# Patient Record
Sex: FEMALE | Race: BLACK OR AFRICAN AMERICAN | RURAL
[De-identification: names, ages, dates, MRNs, and addresses within clinical notes are randomized per-mention and may not be internally consistent; named-entity substitution may affect disease eponyms.]

---

## 2020-11-03 ENCOUNTER — OFFICE VISIT CONVERTED (OUTPATIENT)
Dept: CARDIOLOGY | Facility: CLINIC | Age: 40
End: 2020-11-03
Attending: INTERNAL MEDICINE

## 2021-05-10 NOTE — H&P
History and Physical      Patient Name: Melba Lang   Patient ID: [id number]   Sex: Female   YOB: 1980        Visit Date: November 3, 2020    Provider: Sean Stahl MD   [facility]: [facility]   Location Address: [address] [place] [address]  [id number]          History Of Present Illness  Consult requested by: Teresa KIM   Dear Teresa, I saw Melba Lang in the office today. This is a 40 year old, /Black female. She has no previous cardiac history. Over the past several months, she has had intermittent chest discomfort, moderately intense, random, occurring a couple times a week, lasting 5 to 10 minutes in the left upper chest. It is not induced by physical exertion. 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 She has no previous cardiac risk factors. No excessive family history. She had a baseline EKG recently, which showed sinus rhythm with no ST changes. She had an echocardiogram in September, which showed normal biventricular function, ejection fraction 55-60%, normal diastolic function, borderline left ventricular hypertrophy, trace mitral and trace tricuspid regurgitation.   PAST MEDICAL HISTORY: includes anxiety. PAST SURGICAL HISTORY: Tubes tied.   PSYCHOSOCIAL HISTORY: The patient is single. No tobacco. Moderate alcohol and caffeine intake.   FAMILY HISTORY: Negative for premature coronary artery disease or sudden cardiac death.   CURRENT MEDICATIONS: include Sertraline 100 mg daily; Oxybutynin 15 mg daily. The dosage and frequency of the medications were reviewed with the patient.   ALLERGIES: No known drug allergies.       Review of Systems  · Constitutional  o Admits  o : fatigue, good general health lately, recent weight changes   · Eyes  o Denies  o : double vision, blurred vision  · HENT  o Denies  o : hearing loss or ringing, chronic sinus problem, swollen glands in  "neck  · Cardiovascular  o Admits  o : chest pain, shortness of breath while walking or lying flat  o Denies  o : palpitations (fast, fluttering, or skipping beats), swelling (feet, ankles, hands)  · Respiratory  o Denies  o : chronic or frequent cough, asthma or wheezing, COPD  · Gastrointestinal  o Denies  o : ulcers, nausea or vomiting  · Neurologic  o Denies  o : lightheaded or dizzy, stroke, headaches  · Musculoskeletal  o Admits  o : back pain  o Denies  o : joint pain  · Endocrine  o Denies  o : thyroid disease, diabetes, heat or cold intolerance, excessive thirst or urination  · Heme-Lymph  o Denies  o : bleeding or bruising tendency, anemia      Vitals  Date Time BP Position Site L\R Cuff Size HR RR TEMP (F) WT  HT  BMI kg/m2 BSA m2 O2 Sat FR L/min FiO2 HC       11/03/2020 09:43 /98 Sitting    93 - R   200lbs 0oz 5'  3\" 35.43 2.01       11/03/2020 09:43 /96 Sitting    90 - R                   Physical Examination  · Constitutional  o Appearance  o : Obese, -American female, pleasant, in no acute distress.  · Head and Face  o HEENT  o : No pallor, anicteric. Eyes normal. Moist mucous membranes.  · Neck  o Inspection/Palpation  o : Supple. No hepatosplenomegaly.  o Jugular Veins  o : No JVD. No carotid bruits.  · Respiratory  o Auscultation of Lungs  o : Clear to auscultation bilaterally. No crackles or wheezing.  · Cardiovascular  o Heart  o : S1, S2 is normally heard. No S3. No murmur, rubs, or gallops.  · Gastrointestinal  o Abdominal Examination  o : Soft, non-distended. No palpable hepatosplenomegaly. Bowel sounds heard in all four quadrants.  · Musculoskeletal  o General  o : Normal muscle tone and strength.  · Skin and Subcutaneous Tissue  o General Inspection  o : No skin rashes.  · Extremities  o Extremities  o : Warm and well perfused. Distal pulses present. No pitting pedal edema.     I reviewed her EKG and echocardiogram, which are described above.    Primary care records were " reviewed.           Assessment     1.  Chest pain - Somewhat atypical.  The patient has no significant chronic cardiac risk factors.  Baseline EKG is       unremarkable.  Recent echocardiogram is unremarkable.  2.  Borderline hypertension.  3.  Marked fatigue - Etiology is unclear whether this is due to stress or anxiety.  The patient could possibly have       underlying sleep apnea as well.         Plan     1.  Schedule a treadmill EKG to evaluate chest pain.  2.  Check basic laboratory studies.  3.  Refer to sleep medicine for possible sleep apnea testing.  4.  We will call the patient with her diagnostic results and decide on appropriate follow-up at that point.  She is      agreeable with this plan.    Please let me know if you have any questions regarding her case.    Sincerely,        DOMO Stahl M.D.  [email or web address]           This note was transcribed by Nancy Marquez.  [email or web address]/[email or web address]  The above service was transcribed by Nancy Marquez, and I attest to the accuracy of the note.  CBD.             Electronically Signed by: Nancy Marquez-, -Author on November 5, 2020 10:17:44 AM  Electronically Co-signed by: Sean Stahl MD -Reviewer on November 5, 2020 10:54:37 AM

## 2021-05-14 VITALS
WEIGHT: 200 LBS | SYSTOLIC BLOOD PRESSURE: 140 MMHG | DIASTOLIC BLOOD PRESSURE: 98 MMHG | BODY MASS INDEX: 35.44 KG/M2 | HEIGHT: 63 IN | HEART RATE: 93 BPM